# Patient Record
Sex: MALE | Race: WHITE | NOT HISPANIC OR LATINO | Employment: OTHER | URBAN - METROPOLITAN AREA
[De-identification: names, ages, dates, MRNs, and addresses within clinical notes are randomized per-mention and may not be internally consistent; named-entity substitution may affect disease eponyms.]

---

## 2022-01-04 NOTE — PATIENT DISCUSSION
Patient understands condition, prognosis and need for follow up care. Monitor annually with images.  OS.

## 2022-01-07 ENCOUNTER — DIAGNOSTICS ONLY (OUTPATIENT)
Dept: URBAN - METROPOLITAN AREA CLINIC 7 | Facility: CLINIC | Age: 57
End: 2022-01-07

## 2022-01-07 DIAGNOSIS — E11.3393: ICD-10-CM

## 2022-01-07 PROCEDURE — 92012 INTRM OPH EXAM EST PATIENT: CPT

## 2022-01-07 PROCEDURE — 92250 FUNDUS PHOTOGRAPHY W/I&R: CPT

## 2022-01-07 ASSESSMENT — VISUAL ACUITY
OD_SC: 20/30-1
OS_SC: 20/30-1
OU_CC: 20/20
OD_CC: 20/20
OU_SC: 20/30
OS_CC: 20/20

## 2023-01-09 ENCOUNTER — ESTABLISHED PATIENT (OUTPATIENT)
Facility: LOCATION | Age: 58
End: 2023-01-09

## 2023-01-09 DIAGNOSIS — E11.3393: ICD-10-CM

## 2023-01-09 DIAGNOSIS — H52.03: ICD-10-CM

## 2023-01-09 PROCEDURE — 92015 DETERMINE REFRACTIVE STATE: CPT

## 2023-01-09 PROCEDURE — 92014 COMPRE OPH EXAM EST PT 1/>: CPT

## 2023-01-09 PROCEDURE — 92250 FUNDUS PHOTOGRAPHY W/I&R: CPT

## 2023-01-09 ASSESSMENT — VISUAL ACUITY
OD_SC: 20/30
OS_SC: 20/30
OU_SC: 20/30-1

## 2023-01-09 ASSESSMENT — KERATOMETRY
OD_K1POWER_DIOPTERS: 42.50
OS_K1POWER_DIOPTERS: 42.50
OS_AXISANGLE2_DEGREES: 143
OS_AXISANGLE_DEGREES: 53
OD_K2POWER_DIOPTERS: 42.75
OD_AXISANGLE2_DEGREES: 52
OS_K2POWER_DIOPTERS: 42.75
OD_AXISANGLE_DEGREES: 142

## 2023-01-09 ASSESSMENT — TONOMETRY
OS_IOP_MMHG: 18
OD_IOP_MMHG: 19

## 2024-01-10 ENCOUNTER — ESTABLISHED PATIENT (OUTPATIENT)
Facility: LOCATION | Age: 59
End: 2024-01-10

## 2024-01-10 DIAGNOSIS — H52.03: ICD-10-CM

## 2024-01-10 DIAGNOSIS — H35.89: ICD-10-CM

## 2024-01-10 DIAGNOSIS — E11.3393: ICD-10-CM

## 2024-01-10 DIAGNOSIS — Z79.4: ICD-10-CM

## 2024-01-10 PROCEDURE — 92014 COMPRE OPH EXAM EST PT 1/>: CPT

## 2024-01-10 PROCEDURE — 92250 FUNDUS PHOTOGRAPHY W/I&R: CPT

## 2024-01-10 PROCEDURE — 92015 DETERMINE REFRACTIVE STATE: CPT

## 2024-01-10 ASSESSMENT — KERATOMETRY
OS_K1POWER_DIOPTERS: 42.50
OD_AXISANGLE_DEGREES: 118
OD_K1POWER_DIOPTERS: 42.25
OS_AXISANGLE_DEGREES: 33
OS_K2POWER_DIOPTERS: 42.75
OD_K2POWER_DIOPTERS: 42.75
OD_AXISANGLE2_DEGREES: 28
OS_AXISANGLE2_DEGREES: 123

## 2024-01-10 ASSESSMENT — VISUAL ACUITY
OU_CC: 20/20
OS_CC: 20/25
OD_CC: 20/25-1

## 2024-01-10 ASSESSMENT — TONOMETRY
OS_IOP_MMHG: 13
OD_IOP_MMHG: 18

## 2025-02-06 ENCOUNTER — COMPREHENSIVE EXAM (OUTPATIENT)
Age: 60
End: 2025-02-06

## 2025-02-06 DIAGNOSIS — H35.89: ICD-10-CM

## 2025-02-06 DIAGNOSIS — H52.03: ICD-10-CM

## 2025-02-06 DIAGNOSIS — E11.3393: ICD-10-CM

## 2025-02-06 DIAGNOSIS — Z79.4: ICD-10-CM

## 2025-02-06 PROCEDURE — 92015 DETERMINE REFRACTIVE STATE: CPT

## 2025-02-06 PROCEDURE — 92014 COMPRE OPH EXAM EST PT 1/>: CPT

## 2025-02-06 PROCEDURE — 92250 FUNDUS PHOTOGRAPHY W/I&R: CPT

## 2025-08-06 ENCOUNTER — FOLLOW UP (OUTPATIENT)
Age: 60
End: 2025-08-06

## 2025-08-06 DIAGNOSIS — H35.89: ICD-10-CM

## 2025-08-06 DIAGNOSIS — Z79.4: ICD-10-CM

## 2025-08-06 DIAGNOSIS — E11.3393: ICD-10-CM

## 2025-08-06 PROCEDURE — 99214 OFFICE O/P EST MOD 30 MIN: CPT

## 2025-08-06 PROCEDURE — 92250 FUNDUS PHOTOGRAPHY W/I&R: CPT
